# Patient Record
Sex: FEMALE | Race: OTHER | HISPANIC OR LATINO
[De-identification: names, ages, dates, MRNs, and addresses within clinical notes are randomized per-mention and may not be internally consistent; named-entity substitution may affect disease eponyms.]

---

## 2017-08-17 ENCOUNTER — APPOINTMENT (OUTPATIENT)
Dept: OBGYN | Facility: CLINIC | Age: 62
End: 2017-08-17
Payer: COMMERCIAL

## 2017-08-17 VITALS
BODY MASS INDEX: 28.7 KG/M2 | DIASTOLIC BLOOD PRESSURE: 92 MMHG | SYSTOLIC BLOOD PRESSURE: 130 MMHG | WEIGHT: 152 LBS | HEIGHT: 61 IN

## 2017-08-17 DIAGNOSIS — Z87.39 PERSONAL HISTORY OF OTHER DISEASES OF THE MUSCULOSKELETAL SYSTEM AND CONNECTIVE TISSUE: ICD-10-CM

## 2017-08-17 DIAGNOSIS — N64.3 GALACTORRHEA NOT ASSOCIATED WITH CHILDBIRTH: ICD-10-CM

## 2017-08-17 DIAGNOSIS — Z01.419 ENCOUNTER FOR GYNECOLOGICAL EXAMINATION (GENERAL) (ROUTINE) W/OUT ABNORMAL FINDINGS: ICD-10-CM

## 2017-08-17 PROCEDURE — 99396 PREV VISIT EST AGE 40-64: CPT

## 2017-08-17 PROCEDURE — 82270 OCCULT BLOOD FECES: CPT

## 2017-08-21 LAB
CYTOLOGY CVX/VAG DOC THIN PREP: NORMAL
HPV HIGH+LOW RISK DNA PNL CVX: NEGATIVE

## 2017-09-07 ENCOUNTER — APPOINTMENT (OUTPATIENT)
Dept: OBGYN | Facility: CLINIC | Age: 62
End: 2017-09-07
Payer: COMMERCIAL

## 2017-09-07 VITALS
DIASTOLIC BLOOD PRESSURE: 78 MMHG | SYSTOLIC BLOOD PRESSURE: 146 MMHG | BODY MASS INDEX: 29.45 KG/M2 | WEIGHT: 156 LBS | HEIGHT: 61 IN

## 2017-09-07 PROCEDURE — 99213 OFFICE O/P EST LOW 20 MIN: CPT

## 2017-09-07 PROCEDURE — 81003 URINALYSIS AUTO W/O SCOPE: CPT | Mod: QW

## 2019-03-20 ENCOUNTER — APPOINTMENT (OUTPATIENT)
Dept: OBGYN | Facility: CLINIC | Age: 64
End: 2019-03-20
Payer: COMMERCIAL

## 2019-03-20 VITALS
BODY MASS INDEX: 28.7 KG/M2 | DIASTOLIC BLOOD PRESSURE: 80 MMHG | SYSTOLIC BLOOD PRESSURE: 142 MMHG | HEIGHT: 61 IN | WEIGHT: 152 LBS

## 2019-03-20 DIAGNOSIS — Z00.00 ENCOUNTER FOR GENERAL ADULT MEDICAL EXAMINATION W/OUT ABNORMAL FINDINGS: ICD-10-CM

## 2019-03-20 DIAGNOSIS — R30.0 DYSURIA: ICD-10-CM

## 2019-03-20 PROCEDURE — 99397 PER PM REEVAL EST PAT 65+ YR: CPT

## 2019-03-20 PROCEDURE — 82270 OCCULT BLOOD FECES: CPT

## 2019-03-20 NOTE — PHYSICAL EXAM
[Awake] : awake [Alert] : alert [Acute Distress] : no acute distress [Mass] : no breast mass [Nipple Discharge] : no nipple discharge [Axillary LAD] : no axillary lymphadenopathy [Soft] : soft [Oriented x3] : oriented to person, place, and time [Tender] : non tender [Normal] : cervix [No Bleeding] : there was no active vaginal bleeding [Uterine Adnexae] : were not tender and not enlarged [RRR, No Murmurs] : RRR, no murmurs [Occult Blood] : occult blood test from digital rectal exam was negative [CTAB] : CTAB

## 2019-03-22 LAB
BACTERIA UR CULT: NORMAL
HPV HIGH+LOW RISK DNA PNL CVX: NOT DETECTED

## 2019-03-27 LAB — CYTOLOGY CVX/VAG DOC THIN PREP: NORMAL

## 2019-06-08 ENCOUNTER — INPATIENT (INPATIENT)
Facility: HOSPITAL | Age: 64
LOS: 2 days | Discharge: HOSPICE | DRG: 57 | End: 2019-06-11
Admitting: HOSPITALIST
Payer: COMMERCIAL

## 2019-06-08 VITALS — WEIGHT: 143.3 LBS | HEIGHT: 61 IN

## 2019-06-08 PROCEDURE — 99285 EMERGENCY DEPT VISIT HI MDM: CPT | Mod: 25

## 2019-06-08 RX ORDER — HALOPERIDOL DECANOATE 100 MG/ML
5 INJECTION INTRAMUSCULAR ONCE
Refills: 0 | Status: COMPLETED | OUTPATIENT
Start: 2019-06-08 | End: 2019-06-08

## 2019-06-08 RX ADMIN — HALOPERIDOL DECANOATE 5 MILLIGRAM(S): 100 INJECTION INTRAMUSCULAR at 23:51

## 2019-06-08 RX ADMIN — Medication 2 MILLIGRAM(S): at 23:51

## 2019-06-08 NOTE — ED PROVIDER NOTE - PROGRESS NOTE DETAILS
Marvin CORDERO- pt still agitated, given second dose of ativan and straight cath done, pt had neg ct head and neck and nromal labs except elevated lacatte which could be from seizure, will give fluids and reopeat lacate, admitted fro acute delirium and syncope/ seizure

## 2019-06-08 NOTE — ED ADULT TRIAGE NOTE - CHIEF COMPLAINT QUOTE
pt s/p unwitnessed fall found on ground, no blood thinners. as per  pt with progressive dementia but worsening confusion today. pt states she hit her head. pt poor historian. pt s/p unwitnessed fall found on ground, no blood thinners. as per  pt with progressive dementia but worsening confusion today. pt states she hit her head. pt poor historian. MD called to bedside.

## 2019-06-08 NOTE — ED PROVIDER NOTE - UNABLE TO OBTAIN
Unable to obtain pt's complete hx secondary to pt's current condition. Hx obtained from EMS and pt's . Dementia

## 2019-06-08 NOTE — ED PROVIDER NOTE - MUSCULOSKELETAL, MLM
abnle to sit up, hold neck up, moving LUE and LLE well, trying to get up from the stretcher, no obvious deformity.  R hand spasm (secondary to weakness) able to sit up, hold neck up, moving LUE and LLE well, trying to get up from the stretcher, no obvious deformity.  R hand spasm (secondary to weakness)

## 2019-06-08 NOTE — ED PROVIDER NOTE - OBJECTIVE STATEMENT
65 y/o F pt with PMHx creutzfeldt jeffery disease (chronic R-sided weakness), seizures presents to the ED s/p fall today.  Per  and EMS, pt had an unwitnessed fall today, pt was standing in the kitchen and then  heard pt hit the ground.  Pt admits to hitting her head, does not provide further details about the fall.   states pt was acting more confused than normal all day today, prior to her fall this evening.

## 2019-06-09 DIAGNOSIS — R41.0 DISORIENTATION, UNSPECIFIED: ICD-10-CM

## 2019-06-09 DIAGNOSIS — Z90.710 ACQUIRED ABSENCE OF BOTH CERVIX AND UTERUS: Chronic | ICD-10-CM

## 2019-06-09 LAB
ALBUMIN SERPL ELPH-MCNC: 4.7 G/DL — SIGNIFICANT CHANGE UP (ref 3.3–5.2)
ALP SERPL-CCNC: 118 U/L — SIGNIFICANT CHANGE UP (ref 40–120)
ALT FLD-CCNC: 43 U/L — HIGH
ANION GAP SERPL CALC-SCNC: 17 MMOL/L — SIGNIFICANT CHANGE UP (ref 5–17)
APPEARANCE UR: CLEAR — SIGNIFICANT CHANGE UP
AST SERPL-CCNC: 48 U/L — HIGH
BACTERIA # UR AUTO: ABNORMAL
BASOPHILS # BLD AUTO: 0 K/UL — SIGNIFICANT CHANGE UP (ref 0–0.2)
BASOPHILS NFR BLD AUTO: 0.1 % — SIGNIFICANT CHANGE UP (ref 0–2)
BILIRUB SERPL-MCNC: 0.4 MG/DL — SIGNIFICANT CHANGE UP (ref 0.4–2)
BILIRUB UR-MCNC: NEGATIVE — SIGNIFICANT CHANGE UP
BUN SERPL-MCNC: 19 MG/DL — SIGNIFICANT CHANGE UP (ref 8–20)
CALCIUM SERPL-MCNC: 10 MG/DL — SIGNIFICANT CHANGE UP (ref 8.6–10.2)
CHLORIDE SERPL-SCNC: 105 MMOL/L — SIGNIFICANT CHANGE UP (ref 98–107)
CK MB CFR SERPL CALC: 3.4 NG/ML — SIGNIFICANT CHANGE UP (ref 0–6.7)
CK SERPL-CCNC: 196 U/L — HIGH (ref 25–170)
CO2 SERPL-SCNC: 21 MMOL/L — LOW (ref 22–29)
COLOR SPEC: YELLOW — SIGNIFICANT CHANGE UP
CREAT SERPL-MCNC: 0.72 MG/DL — SIGNIFICANT CHANGE UP (ref 0.5–1.3)
DIFF PNL FLD: ABNORMAL
EOSINOPHIL # BLD AUTO: 0 K/UL — SIGNIFICANT CHANGE UP (ref 0–0.5)
EOSINOPHIL NFR BLD AUTO: 0.4 % — SIGNIFICANT CHANGE UP (ref 0–6)
EPI CELLS # UR: ABNORMAL
GLUCOSE SERPL-MCNC: 166 MG/DL — HIGH (ref 70–115)
GLUCOSE UR QL: NEGATIVE MG/DL — SIGNIFICANT CHANGE UP
HCT VFR BLD CALC: 41.2 % — SIGNIFICANT CHANGE UP (ref 37–47)
HGB BLD-MCNC: 13.9 G/DL — SIGNIFICANT CHANGE UP (ref 12–16)
KETONES UR-MCNC: ABNORMAL
LACTATE BLDV-MCNC: 2.6 MMOL/L — HIGH (ref 0.5–2)
LACTATE BLDV-MCNC: 4.1 MMOL/L — CRITICAL HIGH (ref 0.5–2)
LACTATE SERPL-SCNC: 1.7 MMOL/L — SIGNIFICANT CHANGE UP (ref 0.5–2)
LEUKOCYTE ESTERASE UR-ACNC: NEGATIVE — SIGNIFICANT CHANGE UP
LYMPHOCYTES # BLD AUTO: 1.4 K/UL — SIGNIFICANT CHANGE UP (ref 1–4.8)
LYMPHOCYTES # BLD AUTO: 16.7 % — LOW (ref 20–55)
MCHC RBC-ENTMCNC: 30.3 PG — SIGNIFICANT CHANGE UP (ref 27–31)
MCHC RBC-ENTMCNC: 33.7 G/DL — SIGNIFICANT CHANGE UP (ref 32–36)
MCV RBC AUTO: 89.8 FL — SIGNIFICANT CHANGE UP (ref 81–99)
MONOCYTES # BLD AUTO: 0.6 K/UL — SIGNIFICANT CHANGE UP (ref 0–0.8)
MONOCYTES NFR BLD AUTO: 7.8 % — SIGNIFICANT CHANGE UP (ref 3–10)
NEUTROPHILS # BLD AUTO: 6.2 K/UL — SIGNIFICANT CHANGE UP (ref 1.8–8)
NEUTROPHILS NFR BLD AUTO: 74.9 % — HIGH (ref 37–73)
NITRITE UR-MCNC: NEGATIVE — SIGNIFICANT CHANGE UP
PH UR: 5 — SIGNIFICANT CHANGE UP (ref 5–8)
PLATELET # BLD AUTO: 218 K/UL — SIGNIFICANT CHANGE UP (ref 150–400)
POTASSIUM SERPL-MCNC: 3.7 MMOL/L — SIGNIFICANT CHANGE UP (ref 3.5–5.3)
POTASSIUM SERPL-SCNC: 3.7 MMOL/L — SIGNIFICANT CHANGE UP (ref 3.5–5.3)
PROT SERPL-MCNC: 8.6 G/DL — SIGNIFICANT CHANGE UP (ref 6.6–8.7)
PROT UR-MCNC: 30 MG/DL
RBC # BLD: 4.59 M/UL — SIGNIFICANT CHANGE UP (ref 4.4–5.2)
RBC # FLD: 13.3 % — SIGNIFICANT CHANGE UP (ref 11–15.6)
RBC CASTS # UR COMP ASSIST: ABNORMAL /HPF (ref 0–4)
SODIUM SERPL-SCNC: 143 MMOL/L — SIGNIFICANT CHANGE UP (ref 135–145)
SP GR SPEC: 1.02 — SIGNIFICANT CHANGE UP (ref 1.01–1.02)
TROPONIN T SERPL-MCNC: <0.01 NG/ML — SIGNIFICANT CHANGE UP (ref 0–0.06)
UROBILINOGEN FLD QL: NEGATIVE MG/DL — SIGNIFICANT CHANGE UP
WBC # BLD: 8.2 K/UL — SIGNIFICANT CHANGE UP (ref 4.8–10.8)
WBC # FLD AUTO: 8.2 K/UL — SIGNIFICANT CHANGE UP (ref 4.8–10.8)
WBC UR QL: SIGNIFICANT CHANGE UP

## 2019-06-09 PROCEDURE — 99223 1ST HOSP IP/OBS HIGH 75: CPT

## 2019-06-09 PROCEDURE — 70450 CT HEAD/BRAIN W/O DYE: CPT | Mod: 26

## 2019-06-09 PROCEDURE — 71045 X-RAY EXAM CHEST 1 VIEW: CPT | Mod: 26

## 2019-06-09 PROCEDURE — 12345: CPT | Mod: NC

## 2019-06-09 PROCEDURE — 93010 ELECTROCARDIOGRAM REPORT: CPT

## 2019-06-09 PROCEDURE — 72125 CT NECK SPINE W/O DYE: CPT | Mod: 26

## 2019-06-09 RX ORDER — VALPROIC ACID (AS SODIUM SALT) 250 MG/5ML
500 SOLUTION, ORAL ORAL EVERY 12 HOURS
Refills: 0 | Status: DISCONTINUED | OUTPATIENT
Start: 2019-06-09 | End: 2019-06-11

## 2019-06-09 RX ORDER — DEXTROSE MONOHYDRATE, SODIUM CHLORIDE, AND POTASSIUM CHLORIDE 50; .745; 4.5 G/1000ML; G/1000ML; G/1000ML
1000 INJECTION, SOLUTION INTRAVENOUS
Refills: 0 | Status: DISCONTINUED | OUTPATIENT
Start: 2019-06-09 | End: 2019-06-11

## 2019-06-09 RX ORDER — HALOPERIDOL DECANOATE 100 MG/ML
5 INJECTION INTRAMUSCULAR ONCE
Refills: 0 | Status: COMPLETED | OUTPATIENT
Start: 2019-06-09 | End: 2019-06-09

## 2019-06-09 RX ORDER — SODIUM CHLORIDE 9 MG/ML
1000 INJECTION INTRAMUSCULAR; INTRAVENOUS; SUBCUTANEOUS ONCE
Refills: 0 | Status: COMPLETED | OUTPATIENT
Start: 2019-06-09 | End: 2019-06-09

## 2019-06-09 RX ORDER — HEPARIN SODIUM 5000 [USP'U]/ML
5000 INJECTION INTRAVENOUS; SUBCUTANEOUS EVERY 8 HOURS
Refills: 0 | Status: DISCONTINUED | OUTPATIENT
Start: 2019-06-09 | End: 2019-06-11

## 2019-06-09 RX ORDER — SODIUM CHLORIDE 9 MG/ML
1000 INJECTION, SOLUTION INTRAVENOUS
Refills: 0 | Status: DISCONTINUED | OUTPATIENT
Start: 2019-06-09 | End: 2019-06-10

## 2019-06-09 RX ADMIN — Medication 27.5 MILLIGRAM(S): at 18:49

## 2019-06-09 RX ADMIN — HALOPERIDOL DECANOATE 5 MILLIGRAM(S): 100 INJECTION INTRAMUSCULAR at 21:57

## 2019-06-09 RX ADMIN — HEPARIN SODIUM 5000 UNIT(S): 5000 INJECTION INTRAVENOUS; SUBCUTANEOUS at 18:49

## 2019-06-09 RX ADMIN — SODIUM CHLORIDE 1000 MILLILITER(S): 9 INJECTION INTRAMUSCULAR; INTRAVENOUS; SUBCUTANEOUS at 02:00

## 2019-06-09 RX ADMIN — Medication 2 MILLIGRAM(S): at 03:10

## 2019-06-09 RX ADMIN — SODIUM CHLORIDE 1000 MILLILITER(S): 9 INJECTION INTRAMUSCULAR; INTRAVENOUS; SUBCUTANEOUS at 03:48

## 2019-06-09 RX ADMIN — Medication 2 MILLIGRAM(S): at 21:57

## 2019-06-09 RX ADMIN — HEPARIN SODIUM 5000 UNIT(S): 5000 INJECTION INTRAVENOUS; SUBCUTANEOUS at 21:57

## 2019-06-09 RX ADMIN — SODIUM CHLORIDE 100 MILLILITER(S): 9 INJECTION, SOLUTION INTRAVENOUS at 06:49

## 2019-06-09 RX ADMIN — HEPARIN SODIUM 5000 UNIT(S): 5000 INJECTION INTRAVENOUS; SUBCUTANEOUS at 06:23

## 2019-06-09 NOTE — ED ADULT NURSE REASSESSMENT NOTE - NS ED NURSE REASSESS COMMENT FT1
patient found with head at foot of bed and feet at head of bed. in presence of . at this time patient is not answering questions. safety maintained at all times.

## 2019-06-09 NOTE — ED ADULT NURSE NOTE - CHIEF COMPLAINT QUOTE
pt s/p unwitnessed fall found on ground, no blood thinners. as per  pt with progressive dementia but worsening confusion today. pt states she hit her head. pt poor historian. MD called to bedside.

## 2019-06-09 NOTE — H&P ADULT - NSICDXPASTMEDICALHX_GEN_ALL_CORE_FT
PAST MEDICAL HISTORY:  Dementia associated with other underlying disease with behavioral disturbance

## 2019-06-09 NOTE — CHART NOTE - NSCHARTNOTEFT_GEN_A_CORE
SW met with pt, sister (Vidhi Buckner) and  at bedside. Family states pt was recently in [facility] and was d/c with [facility] services. Nurse went to the house on Wednesday when she declined services so RN was scheduled to go back this Tuesday to see if pt is hospice appropriate. Family is stating they would like pt to go to hospice in since pt's  is having a difficult time caring for pt at home and pt has been drastically declining. Sister states pt will not eat or drink. SW to put in hospice referral. SW to follow. SW met with pt, sister (Vidhi Buckner [phone]) and  at bedside. Family states pt was recently in [facility] and was d/c with [facility] services. Nurse went to the house on Wednesday when she declined services so RN was scheduled to go back this Tuesday to see if pt is hospice appropriate. Family is stating they would like pt to go to hospice in since pt's  is having a difficult time caring for pt at home and pt has been drastically declining. Sister states pt will not eat or drink. SW to put in hospice referral. SW to follow.

## 2019-06-09 NOTE — CONSULT NOTE ADULT - ASSESSMENT
The patient is a 64y Female who is followed by neurology because of CJD, fall    CJD  rapidly progressing dementia   behavioral disturbances  aggressive at times  possible seizure activity    will start depacon  mg bis, can change to depakote  mg bid once more awake/alert  This may also help with behavior issues    May need SW/care management for home services vs placement    d/w Dr. Cochran    will follow with you    Riley Garcia MD PhD   [id number]

## 2019-06-09 NOTE — H&P ADULT - ASSESSMENT
63 y/o female recently diagnosed with CJD ( 3 weeks ago) as per sister at bed side, was brought to the ED s/p fall. As per sister, patient has unwitnessed fall today in the kitchen, her  was home and her the sound ran into the kitchen and found her on the floor. Patient also reported having bowel incontinence. As per her sister, she has been deteriorating neurologically since diagnosed with CJD. She was taken to [facility] in April when she was initially having forgetfulness and was worked up for dementia. She has been having progressive forgetfulness, hallucination and unstable gaits. Patient unable to provide any information because she was sedated in the ED due to agitation.     Fall rule out seizure   Admit to monitor bed   CT head: no acute intracranial finding   Ativan given in the ED for agitation   As per sister, patient was discharged on Keppra bid from [place] but later discontinued when she was diagnosed with CJD   Neurology consult   Fall precaution     Lactic acidosis   Lactic acid: 4.1--->2.1  Likely due to mild rhabdo; CK mildly elevated   Continue gentle hydration   Trend lactate     CJD   Recently diagnosed   Neurology consulted     Supportive   DVT prophylaxis: heparin SC   Diet: NPO for now; advance as needed

## 2019-06-09 NOTE — H&P ADULT - HISTORY OF PRESENT ILLNESS
65 y/o female recently diagnosed with CJD ( 3 weeks ago) as per sister at bed side, was brought to the ED s/p fall. As per sister, patient has unwitnessed fall today in the kitchen, her  was home and her the sound ran into the kitchen and found her on the floor. Patient also reported having bowel incontinence. As per her sister, she has been deteriorating neurologically since diagnosed with CJD. She was taken to [facility] in April when she was initially having forgetfulness and was worked up for dementia. She has been having progressive forgetfulness, hallucination and unstable gaits. Patient unable to provide any information because she was sedated in the ED due to agitation.

## 2019-06-09 NOTE — CHART NOTE - NSCHARTNOTEFT_GEN_A_CORE
Patient seen and examined in ED , sedated .  and sister ( Mrs Vidhi Buckner ) at bed side . Unable to assess due to sedation .   Chart reviewed ,   D/W with family , not able to take care of her any more as patient requires 24 hrs care . As per family   patient seen by [facility] few days ago and plan was to come and reassess this coming Tuesday .   Patient with s/p fall and possible seizure . Neurology consulted and appreciated , started on Depacon .   Will keep NPO , ivf , speech and swallow PTeval . Hospice eval .   D/C plan likely tomorrow .

## 2019-06-09 NOTE — PROCEDURE NOTE - NSPROCDETAILS_GEN_ALL_CORE
location identified, draped/prepped, sterile technique used/blood seen on insertion/flushes easily/dressing applied/secured in place/sterile technique, catheter placed

## 2019-06-09 NOTE — ED ADULT NURSE NOTE - OBJECTIVE STATEMENT
pt lying in bed and is combative, pt covered in feces.  at bedside. as per the pt  the pt fell from a standing position in the kitchen, hit her head but no loc.

## 2019-06-10 ENCOUNTER — TRANSCRIPTION ENCOUNTER (OUTPATIENT)
Age: 64
End: 2019-06-10

## 2019-06-10 DIAGNOSIS — F02.81 DEMENTIA IN OTHER DISEASES CLASSIFIED ELSEWHERE, UNSPECIFIED SEVERITY, WITH BEHAVIORAL DISTURBANCE: ICD-10-CM

## 2019-06-10 PROCEDURE — 99233 SBSQ HOSP IP/OBS HIGH 50: CPT

## 2019-06-10 RX ORDER — VALPROIC ACID (AS SODIUM SALT) 250 MG/5ML
5 SOLUTION, ORAL ORAL
Qty: 0 | Refills: 0 | DISCHARGE
Start: 2019-06-10

## 2019-06-10 RX ORDER — HALOPERIDOL DECANOATE 100 MG/ML
1 INJECTION INTRAMUSCULAR EVERY 6 HOURS
Refills: 0 | Status: DISCONTINUED | OUTPATIENT
Start: 2019-06-10 | End: 2019-06-11

## 2019-06-10 RX ORDER — HALOPERIDOL DECANOATE 100 MG/ML
5 INJECTION INTRAMUSCULAR ONCE
Refills: 0 | Status: COMPLETED | OUTPATIENT
Start: 2019-06-10 | End: 2019-06-10

## 2019-06-10 RX ORDER — MORPHINE SULFATE 50 MG/1
2 CAPSULE, EXTENDED RELEASE ORAL
Qty: 0 | Refills: 0 | DISCHARGE
Start: 2019-06-10

## 2019-06-10 RX ORDER — HALOPERIDOL DECANOATE 100 MG/ML
1 INJECTION INTRAMUSCULAR
Qty: 0 | Refills: 0 | DISCHARGE
Start: 2019-06-10

## 2019-06-10 RX ORDER — MORPHINE SULFATE 50 MG/1
2 CAPSULE, EXTENDED RELEASE ORAL EVERY 6 HOURS
Refills: 0 | Status: DISCONTINUED | OUTPATIENT
Start: 2019-06-10 | End: 2019-06-11

## 2019-06-10 RX ORDER — DIPHENHYDRAMINE HCL 50 MG
25 CAPSULE ORAL ONCE
Refills: 0 | Status: COMPLETED | OUTPATIENT
Start: 2019-06-10 | End: 2019-06-10

## 2019-06-10 RX ORDER — DEXTROSE MONOHYDRATE, SODIUM CHLORIDE, AND POTASSIUM CHLORIDE 50; .745; 4.5 G/1000ML; G/1000ML; G/1000ML
1000 INJECTION, SOLUTION INTRAVENOUS
Qty: 0 | Refills: 0 | DISCHARGE
Start: 2019-06-10

## 2019-06-10 RX ADMIN — Medication 25 MILLIGRAM(S): at 01:18

## 2019-06-10 RX ADMIN — Medication 1 MILLIGRAM(S): at 22:14

## 2019-06-10 RX ADMIN — Medication 27.5 MILLIGRAM(S): at 05:35

## 2019-06-10 RX ADMIN — Medication 27.5 MILLIGRAM(S): at 17:58

## 2019-06-10 RX ADMIN — MORPHINE SULFATE 2 MILLIGRAM(S): 50 CAPSULE, EXTENDED RELEASE ORAL at 17:58

## 2019-06-10 RX ADMIN — HEPARIN SODIUM 5000 UNIT(S): 5000 INJECTION INTRAVENOUS; SUBCUTANEOUS at 05:35

## 2019-06-10 RX ADMIN — HEPARIN SODIUM 5000 UNIT(S): 5000 INJECTION INTRAVENOUS; SUBCUTANEOUS at 14:40

## 2019-06-10 RX ADMIN — HALOPERIDOL DECANOATE 5 MILLIGRAM(S): 100 INJECTION INTRAMUSCULAR at 02:42

## 2019-06-10 RX ADMIN — HEPARIN SODIUM 5000 UNIT(S): 5000 INJECTION INTRAVENOUS; SUBCUTANEOUS at 22:14

## 2019-06-10 RX ADMIN — Medication 2 MILLIGRAM(S): at 01:19

## 2019-06-10 RX ADMIN — HALOPERIDOL DECANOATE 1 MILLIGRAM(S): 100 INJECTION INTRAMUSCULAR at 14:39

## 2019-06-10 RX ADMIN — Medication 2 MILLIGRAM(S): at 06:24

## 2019-06-10 RX ADMIN — Medication 1 MILLIGRAM(S): at 18:01

## 2019-06-10 NOTE — PHYSICAL THERAPY INITIAL EVALUATION ADULT - ADDITIONAL COMMENTS
Per sister at bedside, pt lives with  and son in a 1 story house with 3 steps to enter.   cared for patient. Sister reports pt was amb a  couple weeks ago with assistance.

## 2019-06-10 NOTE — PROGRESS NOTE ADULT - ASSESSMENT
64 yr old female with dementia, recent diagnosis of CJD was admitted for an unwitnessed fall at home. Noted to have worsening memory problems, behavioral disturbance and agitation at home. She was to be evaluated for hospice services. Given history of bowel incontinence, neurology was consulted for possible seizures. Depacon was initiated. CT head negative for acute changes. She was unable to participate in swallow evaluation and required Ativan and Haldol for agitation, pulling out lines.

## 2019-06-10 NOTE — DISCHARGE NOTE PROVIDER - HOSPITAL COURSE
64 yr old female with dementia, recent diagnosis of CJD was admitted for an unwitnessed fall at home. Noted to have worsening memory problems, behavioral disturbance and agitation at home. She was to be evaluated for hospice services. Given history of bowel incontinence, neurology was consulted for possible seizures. Depacon was initiated. CT head negative for acute changes. She was unable to participate in swallow evaluation and required Ativan and Haldol for agitation, pulling out lines. Goals of care were discussed with family. MOLST was done, patient is DNR/DNI. Hospice was consulted, patient has been accepted to the hospice inn.            Spent 35 mins in discharge plan and documentation.

## 2019-06-10 NOTE — GOALS OF CARE CONVERSATION - PERSONAL ADVANCE DIRECTIVE - CONVERSATION DETAILS
Hospice services discussed with the patient's spouse, son and sister. Inpatient hospice options and criteria explained. Hospice consents signed. Patient approved for inpatient hospice. Plan is for the patient to be transferred to the [facility] on 06/11/19.

## 2019-06-10 NOTE — PROGRESS NOTE ADULT - PROBLEM SELECTOR PLAN 1
Overall poor prognosis, hospice evaluation today. Continue IVF. On Ativan and Haldol prn. Enhanced supervision. Fall precautions.

## 2019-06-10 NOTE — CHART NOTE - NSCHARTNOTEFT_GEN_A_CORE
Spoke with patient's son Livan and sister Vidhi. They are aware of poor prognosis and need for more supervision.  They met with hospice and are agreeable to transfer to hospice inn. Sister will visit center today.  MOLST form explained and DNR/DNI signed by son Livan.  Patient's HCP, spouse is in the ED at present as a patient.  Plan of care and medications to help with agitation, pain were ordered, family in agreement with the same.  Placed on CO for safety.  Spent 30 mins in advance care planning and goals of care discussion.

## 2019-06-10 NOTE — DISCHARGE NOTE PROVIDER - NSDCCPCAREPLAN_GEN_ALL_CORE_FT
PRINCIPAL DISCHARGE DIAGNOSIS  Diagnosis: Creutzfeldt Paulie disease  Assessment and Plan of Treatment:       SECONDARY DISCHARGE DIAGNOSES  Diagnosis: Syncope and collapse  Assessment and Plan of Treatment:

## 2019-06-11 ENCOUNTER — TRANSCRIPTION ENCOUNTER (OUTPATIENT)
Age: 64
End: 2019-06-11

## 2019-06-11 VITALS
SYSTOLIC BLOOD PRESSURE: 107 MMHG | DIASTOLIC BLOOD PRESSURE: 73 MMHG | OXYGEN SATURATION: 93 % | RESPIRATION RATE: 18 BRPM | HEART RATE: 108 BPM | TEMPERATURE: 98 F

## 2019-06-11 LAB
HCV AB S/CO SERPL IA: 0.25 S/CO — SIGNIFICANT CHANGE UP (ref 0–0.99)
HCV AB SERPL-IMP: SIGNIFICANT CHANGE UP

## 2019-06-11 PROCEDURE — 96360 HYDRATION IV INFUSION INIT: CPT

## 2019-06-11 PROCEDURE — 99232 SBSQ HOSP IP/OBS MODERATE 35: CPT

## 2019-06-11 PROCEDURE — 93005 ELECTROCARDIOGRAM TRACING: CPT

## 2019-06-11 PROCEDURE — 99239 HOSP IP/OBS DSCHRG MGMT >30: CPT

## 2019-06-11 PROCEDURE — 97163 PT EVAL HIGH COMPLEX 45 MIN: CPT

## 2019-06-11 PROCEDURE — 82553 CREATINE MB FRACTION: CPT

## 2019-06-11 PROCEDURE — 81001 URINALYSIS AUTO W/SCOPE: CPT

## 2019-06-11 PROCEDURE — 99285 EMERGENCY DEPT VISIT HI MDM: CPT | Mod: 25

## 2019-06-11 PROCEDURE — 71045 X-RAY EXAM CHEST 1 VIEW: CPT

## 2019-06-11 PROCEDURE — 80053 COMPREHEN METABOLIC PANEL: CPT

## 2019-06-11 PROCEDURE — 36415 COLL VENOUS BLD VENIPUNCTURE: CPT

## 2019-06-11 PROCEDURE — 82550 ASSAY OF CK (CPK): CPT

## 2019-06-11 PROCEDURE — 83605 ASSAY OF LACTIC ACID: CPT

## 2019-06-11 PROCEDURE — 96372 THER/PROPH/DIAG INJ SC/IM: CPT | Mod: XU

## 2019-06-11 PROCEDURE — 86803 HEPATITIS C AB TEST: CPT

## 2019-06-11 PROCEDURE — 85027 COMPLETE CBC AUTOMATED: CPT

## 2019-06-11 PROCEDURE — 70450 CT HEAD/BRAIN W/O DYE: CPT

## 2019-06-11 PROCEDURE — 84484 ASSAY OF TROPONIN QUANT: CPT

## 2019-06-11 PROCEDURE — 96361 HYDRATE IV INFUSION ADD-ON: CPT

## 2019-06-11 PROCEDURE — 72125 CT NECK SPINE W/O DYE: CPT

## 2019-06-11 RX ADMIN — Medication 1 MILLIGRAM(S): at 05:02

## 2019-06-11 RX ADMIN — MORPHINE SULFATE 2 MILLIGRAM(S): 50 CAPSULE, EXTENDED RELEASE ORAL at 13:16

## 2019-06-11 RX ADMIN — MORPHINE SULFATE 2 MILLIGRAM(S): 50 CAPSULE, EXTENDED RELEASE ORAL at 00:27

## 2019-06-11 RX ADMIN — HEPARIN SODIUM 5000 UNIT(S): 5000 INJECTION INTRAVENOUS; SUBCUTANEOUS at 13:16

## 2019-06-11 RX ADMIN — Medication 1 MILLIGRAM(S): at 13:16

## 2019-06-11 RX ADMIN — Medication 27.5 MILLIGRAM(S): at 05:02

## 2019-06-11 RX ADMIN — HEPARIN SODIUM 5000 UNIT(S): 5000 INJECTION INTRAVENOUS; SUBCUTANEOUS at 05:02

## 2019-06-11 RX ADMIN — MORPHINE SULFATE 2 MILLIGRAM(S): 50 CAPSULE, EXTENDED RELEASE ORAL at 13:21

## 2019-06-11 RX ADMIN — MORPHINE SULFATE 2 MILLIGRAM(S): 50 CAPSULE, EXTENDED RELEASE ORAL at 05:00

## 2019-06-11 NOTE — PROGRESS NOTE ADULT - ASSESSMENT
64 yr old female with dementia, recent diagnosis of CJD was admitted for an unwitnessed fall at home. Noted to have worsening memory problems, behavioral disturbance and agitation at home. She was to be evaluated for hospice services. Given history of bowel incontinence, neurology was consulted for possible seizures. Depacon was initiated. CT head negative for acute changes. She was unable to participate in swallow evaluation and required Ativan and Haldol for agitation, pulling out lines. Goals of care were discussed with family, MOLST was completed. Patient was made DNR/DNI by family, hospice was consulted, she was accepted to the hospice inn. Medications for agitation were initiated. Family understanding of poor prognosis.

## 2019-06-11 NOTE — PROGRESS NOTE ADULT - ASSESSMENT
64y Female who is followed by neurology because of CJD, fall    CJD  Rapidly progressing dementia.  Behavioral disturbances.  Aggressive at times.  Possible seizure activity.  Agree that hospice is appropriate.     On valproic acid now for seizure prophylaxis.  This may also help with behavior issues

## 2019-06-11 NOTE — PROGRESS NOTE ADULT - SUBJECTIVE AND OBJECTIVE BOX
[facility]                                     Neurology at [place]                                 Radha Solomon, & Vickey                                  [address]                                        [place], [place], [address]                                             [phone]    CC: CJD, fall  HPI: The patient is a 64y Female who presented with unwitnessed fall.  There was no clear seizure activity seen, but there was bowel incontinence.  She was diagnosed last month with CJD at [place].  This was done with EEG and CSF analysis and apparently her case was reviewed by [facility].  She has been rapidly deteriorating since her diagnosis.  It is difficult for family to care for her at home.  Neurology was asked to evaluate.    PAST MEDICAL & SURGICAL HISTORY:  Dementia associated with other underlying disease with behavioral disturbance  CJD  History of total hysterectomy      MEDICATIONS  (STANDING):  heparin  Injectable 5000 Unit(s) SubCutaneous every 8 hours  multiple electrolytes Injection Type 1 1000 milliLiter(s) (100 mL/Hr) IV Continuous <Continuous>  valproate sodium IVPB 500 milliGRAM(s) IV Intermittent every 12 hour      SOCIAL HISTORY:  no tob,   no alcohol   no drugs    FAMILY HISTORY:  n/c      ROS: 14 point ROS negative other than what is present in HPI or below  sedated    Vital Signs Last 24 Hrs  T(C): 36.9 (09 Jun 2019 11:09), Max: 36.9 (09 Jun 2019 11:09)  T(F): 98.5 (09 Jun 2019 11:09), Max: 98.5 (09 Jun 2019 11:09)  HR: 64 (09 Jun 2019 11:09) (64 - 107)  BP: 130/65 (09 Jun 2019 11:09) (110/69 - 172/87)  BP(mean): --  RR: 18 (09 Jun 2019 11:09) (18 - 20)  SpO2: 99% (09 Jun 2019 11:09) (99% - 99%)      Detailed Neurologic Exam:    Mental status: The patient is  not speaking.  Lethargic. Follows no instructions    Cranial nerves: Pupils equal and react symmetrically to light.  Extraocular motion is full with dolls eyes maneuvers. There is no ptosis. Facial sensation is not able to be assessed. Facial musculature is grossly symmetric.      Motor: There is normal bulk and tone.  There is no tremor.  moves weakly but symmetrically to stimuli    Sensation: Grimace/movemnt to pinch in 4 extremities    Reflexes: 2+ throughout and plantar responses are equivocal.    Cerebellar: unable to assess  dysmetria on finger to nose testing.      LABS:                         13.9   8.2   )-----------( 218      ( 09 Jun 2019 00:46 )             41.2       06-09    143  |  105  |  19.0  ----------------------------<  166<H>  3.7   |  21.0<L>  |  0.72    Ca    10.0      09 Jun 2019 00:46    TPro  8.6  /  Alb  4.7  /  TBili  0.4  /  DBili  x   /  AST  48<H>  /  ALT  43<H>  /  AlkPhos  118  06-09      RADIOLOGY & ADDITIONAL STUDIES (independently reviewed unless otherwise noted):  CT head- acute CVA, mass or blood    Assessment and Recommendation:   · Assessment		  The patient is a 64y Female who is followed by neurology because of CJD, fall    CJD  rapidly progressing dementia   behavioral disturbances  aggressive at times  possible seizure activity    On depacon  mg bid, can change to depakote  mg bid once more awake/alert  This may also help with behavior issues    Carlos Hurd MD
INTERVAL HPI/OVERNIGHT EVENTS:    CC: CJD, r/o seizures.    Events noted, on Ativan. More calm.    Vital Signs Last 24 Hrs  T(C): 36.8 (11 Jun 2019 07:15), Max: 37.1 (10 Cem 2019 16:00)  T(F): 98.3 (11 Jun 2019 07:15), Max: 98.7 (10 Cem 2019 16:00)  HR: 92 (11 Jun 2019 07:15) (92 - 136)  BP: 113/78 (11 Jun 2019 07:15) (113/78 - 150/88)  BP(mean): --  RR: 18 (11 Jun 2019 07:15) (18 - 18)  SpO2: 98% (11 Jun 2019 07:15) (95% - 98%)    PHYSICAL EXAM:    GENERAL: sedated, appears comfortable.  CHEST/LUNG: b/l air entry, clear  HEART: Regular   ABDOMEN: Soft, BS+  EXTREMITIES: No edema, tenderness.     MEDICATIONS  (STANDING):  dextrose 5% + sodium chloride 0.45% with potassium chloride 10 mEq/L 1000 milliLiter(s) (100 mL/Hr) IV Continuous <Continuous>  heparin  Injectable 5000 Unit(s) SubCutaneous every 8 hours  LORazepam   Injectable 1 milliGRAM(s) IV Push every 4 hours  morphine  - Injectable 2 milliGRAM(s) IV Push every 6 hours  valproate sodium IVPB 500 milliGRAM(s) IV Intermittent every 12 hours    MEDICATIONS  (PRN):  haloperidol    Injectable 1 milliGRAM(s) IV Push every 6 hours PRN Agitation      Allergies    No Known Allergies    Intolerances          LABS:                  RADIOLOGY & ADDITIONAL TESTS:
INTERVAL HPI/OVERNIGHT EVENTS:    CC: worsening dementia, r/o seizures    Chart and course reviewed. Patient noted to be restless, not answering questions. Per RN, required medications for agitation and pulling out lines over night. Swallow evaluation could not be attempted given mental status. Per RN and SW notes, her spouse was taken to  ED for depression.     Vital Signs Last 24 Hrs  T(C): 36.4 (10 Cem 2019 11:33), Max: 36.6 (10 Cem 2019 04:54)  T(F): 97.6 (10 Cem 2019 11:33), Max: 97.8 (10 Cem 2019 04:54)  HR: 116 (10 Cem 2019 11:33) (103 - 116)  BP: 150/88 (10 Cem 2019 11:33) (130/69 - 151/77)  BP(mean): --  RR: 18 (10 Cem 2019 11:33) (18 - 19)  SpO2: 97% (10 Cem 2019 11:33) (95% - 97%)    PHYSICAL EXAM:    GENERAL: Not in distress, restless, does not respond to questions, moving all extremities  CHEST/LUNG: b/l air entry  HEART: Regular  ABDOMEN: Soft, BS+  EXTREMITIES:  No edema, tenderness.    MEDICATIONS  (STANDING):  dextrose 5% + sodium chloride 0.45% with potassium chloride 10 mEq/L 1000 milliLiter(s) (100 mL/Hr) IV Continuous <Continuous>  heparin  Injectable 5000 Unit(s) SubCutaneous every 8 hours  multiple electrolytes Injection Type 1 1000 milliLiter(s) (100 mL/Hr) IV Continuous <Continuous>  valproate sodium IVPB 500 milliGRAM(s) IV Intermittent every 12 hours    MEDICATIONS  (PRN):  haloperidol    Injectable 1 milliGRAM(s) IV Push every 6 hours PRN Agitation  LORazepam   Injectable 1 milliGRAM(s) IntraMuscular every 4 hours PRN Self-Injurious behavior      Allergies    No Known Allergies    Intolerances          LABS:                          13.9   8.2   )-----------( 218      ( 2019 00:46 )             41.2     06-    143  |  105  |  19.0  ----------------------------<  166<H>  3.7   |  21.0<L>  |  0.72    Ca    10.0      2019 00:46    TPro  8.6  /  Alb  4.7  /  TBili  0.4  /  DBili  x   /  AST  48<H>  /  ALT  43<H>  /  AlkPhos  118  06-09      Urinalysis Basic - ( 2019 03:44 )    Color: Yellow / Appearance: Clear / S.020 / pH: x  Gluc: x / Ketone: Trace  / Bili: Negative / Urobili: Negative mg/dL   Blood: x / Protein: 30 mg/dL / Nitrite: Negative   Leuk Esterase: Negative / RBC: 6-10 /HPF / WBC 0-2   Sq Epi: x / Non Sq Epi: Moderate / Bacteria: Occasional        RADIOLOGY & ADDITIONAL TESTS:
[facility]                                        Neurology at [place]                                 Radha Solomon, & Vickey                                  [address]                                        [place], [place], [address]                                             [phone]        CC: CJD and fall.     HPI:   The patient is a 64y Female who presented with unwitnessed fall.  There was no clear seizure activity seen, but there was bowel incontinence.  She was diagnosed last month with CJD at [place].  This was done with EEG and CSF analysis and apparently her case was reviewed by [facility].  She has been rapidly deteriorating since her diagnosis.  It is difficult for family to care for her at home.    Interim history:  Remains in ER awaiting bed.   She had been agitated and was given Ativan. The last dose was at 5 am this morning.     ROS:   Denies headache or dizziness.  Denies chest pain.  Denies shortness of breath.    MEDICATIONS  (STANDING):  dextrose 5% + sodium chloride 0.45% with potassium chloride 10 mEq/L 1000 milliLiter(s) (100 mL/Hr) IV Continuous <Continuous>  heparin  Injectable 5000 Unit(s) SubCutaneous every 8 hours  LORazepam   Injectable 1 milliGRAM(s) IV Push every 4 hours  morphine  - Injectable 2 milliGRAM(s) IV Push every 6 hours  valproate sodium IVPB 500 milliGRAM(s) IV Intermittent every 12 hours      Vital Signs Last 24 Hrs  T(C): 36.8 (11 Jun 2019 07:15), Max: 37.1 (10 Cem 2019 16:00)  T(F): 98.3 (11 Jun 2019 07:15), Max: 98.7 (10 Cem 2019 16:00)  HR: 92 (11 Jun 2019 07:15) (92 - 136)  BP: 113/78 (11 Jun 2019 07:15) (113/78 - 150/88)  RR: 18 (11 Jun 2019 07:15) (18 - 18)  SpO2: 98% (11 Jun 2019 07:15) (95% - 98%)    Detailed Neurologic Exam:    Mental status: Lethargic. Opens eyes minimally to stimuli and closes immediately. Non verbal. Not following instructions.      Cranial nerves: Pupils equal and react symmetrically to light. There is no blink to threat. Extraocular motion is full to oculocephalic maneuvers. Corneal reflexes present. Facial musculature is symmetric. Palate and tongue cannot be assessed.     Motor/Sensory: There is normal bulk and tone.  There is no tremor.  Minimal movement of extremities to noxious stimuli.     Reflexes: 2+ throughout and plantar responses are flexor.    Cerebellar: Unable to assess.

## 2019-06-11 NOTE — CHART NOTE - NSCHARTNOTEFT_GEN_A_CORE
IV infiltrate left arm  Pt comfort care  Left arm + edema hand and lower arm  +sensory + pulses warm to touch nontender  no neurosensory deficits noted   elevate  warm soak continue to monitor

## 2019-06-11 NOTE — DISCHARGE NOTE NURSING/CASE MANAGEMENT/SOCIAL WORK - NSDCDPATPORTLINK_GEN_ALL_CORE
You can access the [facility] Patient Portal, offered by [facility], by registering with the following website: [email or web address]

## 2020-05-28 NOTE — ED ADULT NURSE NOTE - DRUG PRE-SCREENING (DAST -1)
confidence and compliance with       adhering to behavioral change plan   [x] Discussed potential barriers to change   [x] Discussed self-care (sleep, nutrition, rewarding activities, social support, exercise)    Other:   []   []   []   []    Recommendations to patient:    1. Return to Dr. Brenton Schultz in 2 week(s)    2.                Feedback provided to pt's PCP via EPIC and/or oral report Statement Selected

## 2025-01-18 NOTE — CONSULT NOTE ADULT - SUBJECTIVE AND OBJECTIVE BOX
Encounter opened to verify pharmacy benefits. PA initiated- and drug covered without pa.     Melania Lipscomb (Key: [id number])  Need Help? Call us at [phone]    Outcome    Drug is covered by current benefit plan. No further PA activity needed  Drug  FLUoxetine HCl 10MG capsules    Form  [employer] Electronic PA Form (2017 NCPDP)  
[facility]                                     [facility] [place]                                 Radha Solomon, & Vickey                                  [address]                                        [place], [place], [address]                                             [phone]    CC: CJD, fall  HPI: The patient is a 64y Female who presented with unwitnessed fall.  There was no clear seziure activity seen, but there was bowel incontinence.  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  This was done with EEG and CSF analysis and apparently her case was reviewed by [facility].  She has been rapidly deteriorating since her diagnosis.  It is difficult for family to care for her at home.  Neurology is asked to evaluate.    PAST MEDICAL & SURGICAL HISTORY:  Dementia associated with other underlying disease with behavioral disturbance  CJD  History of total hysterectomy      MEDICATIONS  (STANDING):  heparin  Injectable 5000 Unit(s) SubCutaneous every 8 hours  multiple electrolytes Injection Type 1 1000 milliLiter(s) (100 mL/Hr) IV Continuous <Continuous>  valproate sodium IVPB 500 milliGRAM(s) IV Intermittent every 12 hours    MEDICATIONS  (PRN):      Allergies    No Known Allergies    Intolerances        SOCIAL HISTORY:  no tob,   no alcohol   no drugs    FAMILY HISTORY:  n/c      ROS: 14 point ROS negative other than what is present in HPI or below  sedated    Vital Signs Last 24 Hrs  T(C): 36.9 (09 Jun 2019 11:09), Max: 36.9 (09 Jun 2019 11:09)  T(F): 98.5 (09 Jun 2019 11:09), Max: 98.5 (09 Jun 2019 11:09)  HR: 64 (09 Jun 2019 11:09) (64 - 107)  BP: 130/65 (09 Jun 2019 11:09) (110/69 - 172/87)  BP(mean): --  RR: 18 (09 Jun 2019 11:09) (18 - 20)  SpO2: 99% (09 Jun 2019 11:09) (99% - 99%)      Detailed Neurologic Exam:    Mental status: The patient is sedatedand not speaking.  Unable to assess orientation or language.    Cranial nerves: Pupils equal and react symmetrically to light. Unable to assess visual field deficit to threat. Extraocular motion is full with dolls eyes maneuvers. There is no ptosis. Facial sensation is not able to be assessed. Facial musculature is grossly symmetric.  Unable to assess palate, shoulder and tongue.    Motor: There is normal bulk and tone.  There is no tremor.  moves weakly but symmetrically to stimuli    Sensation: Grimace/moveemnt to pinch in 4 extremities    Reflexes: 2+ throughout and plantar responses are equivocal.    Cerebellar: unable to assess  dysmetria on finger to nose testing.    Gait: unable to assess due to condition    LABS:                         13.9   8.2   )-----------( 218      ( 09 Jun 2019 00:46 )             41.2       06-09    143  |  105  |  19.0  ----------------------------<  166<H>  3.7   |  21.0<L>  |  0.72    Ca    10.0      09 Jun 2019 00:46    TPro  8.6  /  Alb  4.7  /  TBili  0.4  /  DBili  x   /  AST  48<H>  /  ALT  43<H>  /  AlkPhos  118  06-09      RADIOLOGY & ADDITIONAL STUDIES (independently reviewed unless otherwise noted):  CT head- acute CVA, mass or blood